# Patient Record
Sex: FEMALE | Race: BLACK OR AFRICAN AMERICAN | NOT HISPANIC OR LATINO
[De-identification: names, ages, dates, MRNs, and addresses within clinical notes are randomized per-mention and may not be internally consistent; named-entity substitution may affect disease eponyms.]

---

## 2017-06-05 ENCOUNTER — TRANSCRIPTION ENCOUNTER (OUTPATIENT)
Age: 47
End: 2017-06-05

## 2022-11-03 ENCOUNTER — APPOINTMENT (OUTPATIENT)
Dept: INFECTIOUS DISEASE | Facility: CLINIC | Age: 52
End: 2022-11-03

## 2022-11-03 PROBLEM — Z00.00 ENCOUNTER FOR PREVENTIVE HEALTH EXAMINATION: Status: ACTIVE | Noted: 2022-11-03

## 2022-11-15 ENCOUNTER — NON-APPOINTMENT (OUTPATIENT)
Age: 52
End: 2022-11-15

## 2022-11-17 ENCOUNTER — LABORATORY RESULT (OUTPATIENT)
Age: 52
End: 2022-11-17

## 2022-11-17 ENCOUNTER — APPOINTMENT (OUTPATIENT)
Dept: INFECTIOUS DISEASE | Facility: CLINIC | Age: 52
End: 2022-11-17

## 2022-11-17 ENCOUNTER — NON-APPOINTMENT (OUTPATIENT)
Age: 52
End: 2022-11-17

## 2022-11-17 VITALS
SYSTOLIC BLOOD PRESSURE: 153 MMHG | HEART RATE: 72 BPM | HEIGHT: 62 IN | WEIGHT: 198 LBS | BODY MASS INDEX: 36.44 KG/M2 | TEMPERATURE: 97.7 F | DIASTOLIC BLOOD PRESSURE: 89 MMHG | OXYGEN SATURATION: 98 %

## 2022-11-17 DIAGNOSIS — E11.9 TYPE 2 DIABETES MELLITUS W/OUT COMPLICATIONS: ICD-10-CM

## 2022-11-17 DIAGNOSIS — I10 ESSENTIAL (PRIMARY) HYPERTENSION: ICD-10-CM

## 2022-11-17 DIAGNOSIS — J30.2 OTHER SEASONAL ALLERGIC RHINITIS: ICD-10-CM

## 2022-11-17 DIAGNOSIS — E78.5 HYPERLIPIDEMIA, UNSPECIFIED: ICD-10-CM

## 2022-11-17 PROCEDURE — 99203 OFFICE O/P NEW LOW 30 MIN: CPT

## 2022-11-17 RX ORDER — CHOLECALCIFEROL (VITAMIN D3) 10(400)/ML
DROPS ORAL
Qty: 100 | Refills: 0 | Status: ACTIVE | COMMUNITY
Start: 2022-07-06

## 2022-11-17 RX ORDER — SEMAGLUTIDE 1.34 MG/ML
2 INJECTION, SOLUTION SUBCUTANEOUS
Qty: 2 | Refills: 0 | Status: ACTIVE | COMMUNITY
Start: 2022-10-21

## 2022-11-17 RX ORDER — GLIPIZIDE 5 MG/1
5 TABLET ORAL
Qty: 90 | Refills: 0 | Status: ACTIVE | COMMUNITY
Start: 2022-10-21

## 2022-11-17 RX ORDER — BLOOD-GLUCOSE METER
W/DEVICE EACH MISCELLANEOUS
Qty: 1 | Refills: 0 | Status: ACTIVE | COMMUNITY
Start: 2022-07-06

## 2022-11-17 RX ORDER — ISOPROPYL ALCOHOL 70 ML/100ML
70 SWAB TOPICAL
Qty: 100 | Refills: 0 | Status: ACTIVE | COMMUNITY
Start: 2022-07-06

## 2022-11-17 RX ORDER — HYDROCHLOROTHIAZIDE 12.5 MG/1
12.5 CAPSULE ORAL
Qty: 30 | Refills: 0 | Status: ACTIVE | COMMUNITY
Start: 2022-11-08

## 2022-11-17 RX ORDER — AZELASTINE HYDROCHLORIDE 137 UG/1
0.1 SPRAY, METERED NASAL
Refills: 0 | Status: ACTIVE | COMMUNITY
Start: 2022-09-15

## 2022-11-17 RX ORDER — ATORVASTATIN CALCIUM 20 MG/1
20 TABLET, FILM COATED ORAL
Refills: 0 | Status: ACTIVE | COMMUNITY
Start: 2022-10-30

## 2022-11-17 RX ORDER — QUINAPRIL HYDROCHLORIDE 40 MG/1
40 TABLET, FILM COATED ORAL
Qty: 30 | Refills: 0 | Status: ACTIVE | COMMUNITY
Start: 2022-11-08

## 2022-11-17 RX ORDER — AMLODIPINE BESYLATE 10 MG/1
10 TABLET ORAL
Qty: 30 | Refills: 0 | Status: ACTIVE | COMMUNITY
Start: 2022-10-22

## 2022-11-17 RX ORDER — BLOOD SUGAR DIAGNOSTIC
STRIP MISCELLANEOUS
Qty: 100 | Refills: 0 | Status: ACTIVE | COMMUNITY
Start: 2022-08-22

## 2022-11-22 ENCOUNTER — APPOINTMENT (OUTPATIENT)
Dept: INFECTIOUS DISEASE | Facility: CLINIC | Age: 52
End: 2022-11-22

## 2022-11-22 PROCEDURE — 99213 OFFICE O/P EST LOW 20 MIN: CPT | Mod: 95

## 2022-11-22 NOTE — HISTORY OF PRESENT ILLNESS
[FreeTextEntry1] : Reason For telehealth  Visit 22\par \par ANANYA VILLEGAS is a pleasant 51 year old female being seen for a second evaluation of an existing diagnosis.\par All labs were resulted and HIV-1 was confirmed positive with low level viremia of 39. \par KARYN was also noted elevated at 1:320. and will need a visit with rheumatology. \par I discussed risks and benefits of medication and will start on Biktarvy once daily . I sent to [facility]. I will see her again in one month. \par She states she is ok with the diagnosis. And will reach out for groups if needed. \par Med Hx : She was  kidney specialist due to spilling protein and took and HIV test and was positive, did not come in with labs in our ID  office . Jose lives in [place]. one 24 year old child who is negative . Upcoming Marriage to long term male partner of 7 yars. She last tested for HIV about 10 years ago. Some Tattoos and no transfusions. \par meds: HCTZ, lipitor, glyerzide. Ozempic, axelistein nasal spray., amlodipine, quinapril \par PMH : gallstones, HTN, diabetic, hyperlipidemia,\par Surgury: one \par social drinker, non smoker.\par both parents --mother passed from Leukemia age 65 and father esophgeeal cancer( drinker and smoker) in 50's. \par No allergies to meds and seasonal allergies. \par plan 22\par 1) start Biktarvy, sent to [facility]\par 2) see in person in one month. \par 3) needs rheumatology referral and social work. \par \par  \par Physical Exam\par Constitutional: alert and in no acute distress. \par Eyes: the sclera and conjunctiva were normal, pupils were equal in size, round, reactive to light and extraocular movements were intact. \par ENT: the ears and nose were normal in appearance . the oropharynx was normal with no thrush. \par Neck: the appearance of the neck was normal, no neck mass was observed and the thyroid was not enlarged . there was no jugular-venous distention. \par Pulmonary: no respiratory distress and lungs were clear to auscultation bilaterally. \par Heart: heart rate was normal and rhythm regular, normal S1 and S2, no gallops, no murmurs and no pericardial rub. \par Vascular: the pedal pulses are present . there was no peripheral edema. \par Abdomen: normal bowel sounds, soft, non-tender, no hepato-splenomegaly, no abdominal mass palpated and no CVA tenderness. \par Lymphatics: no palpable adenopathy. \par Musculoskeletal: no joint swelling, no clubbing or cyanosis of the fingernails, muscle strength and tone were normal. \par Skin: normal skin color and pigmentation and no rash. \par Neurological: deep tendon reflexes were 2+ and symmetric, the sensory exam was normal to light touch and pinprick and no focal deficits. \par Psychiatric: oriented to person, place, and time and the affect was normal. \par  \par     \par \par Electronically signed by : SUNDAY GHOTRA N.P.; 2022 10:19AM EST (Author)\par

## 2022-11-30 ENCOUNTER — NON-APPOINTMENT (OUTPATIENT)
Age: 52
End: 2022-11-30

## 2022-12-29 ENCOUNTER — APPOINTMENT (OUTPATIENT)
Dept: INFECTIOUS DISEASE | Facility: CLINIC | Age: 52
End: 2022-12-29
Payer: COMMERCIAL

## 2022-12-29 ENCOUNTER — OUTPATIENT (OUTPATIENT)
Dept: OUTPATIENT SERVICES | Facility: HOSPITAL | Age: 52
LOS: 1 days | End: 2022-12-29
Payer: COMMERCIAL

## 2022-12-29 VITALS
WEIGHT: 198 LBS | DIASTOLIC BLOOD PRESSURE: 86 MMHG | TEMPERATURE: 98.2 F | OXYGEN SATURATION: 99 % | HEIGHT: 62 IN | HEART RATE: 104 BPM | BODY MASS INDEX: 36.44 KG/M2 | SYSTOLIC BLOOD PRESSURE: 148 MMHG

## 2022-12-29 DIAGNOSIS — Z00.00 ENCOUNTER FOR GENERAL ADULT MEDICAL EXAMINATION WITHOUT ABNORMAL FINDINGS: ICD-10-CM

## 2022-12-29 DIAGNOSIS — Z23 ENCOUNTER FOR IMMUNIZATION: ICD-10-CM

## 2022-12-29 PROCEDURE — 86360 T CELL ABSOLUTE COUNT/RATIO: CPT

## 2022-12-29 PROCEDURE — 99214 OFFICE O/P EST MOD 30 MIN: CPT | Mod: 25

## 2022-12-29 PROCEDURE — 90739 HEPB VACC 2/4 DOSE ADULT IM: CPT

## 2022-12-29 PROCEDURE — 85025 COMPLETE CBC W/AUTO DIFF WBC: CPT

## 2022-12-29 PROCEDURE — 90472 IMMUNIZATION ADMIN EACH ADD: CPT

## 2022-12-29 PROCEDURE — 90686 IIV4 VACC NO PRSV 0.5 ML IM: CPT

## 2022-12-29 PROCEDURE — 87536 HIV-1 QUANT&REVRSE TRNSCRPJ: CPT

## 2022-12-29 PROCEDURE — G0008: CPT

## 2022-12-29 PROCEDURE — 86359 T CELLS TOTAL COUNT: CPT

## 2022-12-29 PROCEDURE — 36415 COLL VENOUS BLD VENIPUNCTURE: CPT

## 2022-12-29 PROCEDURE — G0010: CPT

## 2022-12-29 PROCEDURE — 80053 COMPREHEN METABOLIC PANEL: CPT

## 2022-12-29 NOTE — HISTORY OF PRESENT ILLNESS
[FreeTextEntry1] : 22\par ANANYA VILLEGAS is a pleasant 51 year old female being seen for a second evaluation of an existing diagnosis.\par All labs were resulted and HIV-1 was confirmed positive with low level viremia of 39. \par KARYN was also noted elevated at 1:320. and will need a visit with rheumatology. \par I discussed risks and benefits of medication and will start on Biktarvy once daily . I sent to [facility]. I will see her again in one month. \par She states she is ok with the diagnosis. And will reach out for groups if needed. \par Med Hx : She was kidney specialist due to spilling protein and took and HIV test and was positive, did not come in with labs in our ID office . Jose lives in [place]. one 24 year old child who is negative . Upcoming Marriage to long term male partner of 7 yars. She last tested for HIV about 10 years ago. Some Tattoos and no transfusions. \par meds: HCTZ, lipitor, glyerzide. Ozempic, axelistein nasal spray., amlodipine, quinapril \par PMH : gallstones, HTN, diabetic, hyperlipidemia,\par Surgury: one \par social drinker, non smoker.\par both parents --mother passed from Leukemia age 65 and father esophgeeal cancer( drinker and smoker) in 50's. \par No allergies to meds and seasonal allergies. \par plan 22\par 1) Patient wants to hold off  Biktarvy--still processing the diagnosis, sent to [facility]\par 2) see in person in 2 month. \par 3) interested in Hep B vaccine and shingles vaccine \par \par  \par Physical Exam\par Constitutional: alert and in no acute distress. \par Eyes: the sclera and conjunctiva were normal, pupils were equal in size, round, reactive to light and extraocular movements were intact. \par ENT: the ears and nose were normal in appearance . the oropharynx was normal with no thrush. \par Neck: the appearance of the neck was normal, no neck mass was observed and the thyroid was not enlarged . there was no jugular-venous distention. \par Pulmonary: no respiratory distress and lungs were clear to auscultation bilaterally. \par Heart: heart rate was normal and rhythm regular, normal S1 and S2, no gallops, no murmurs and no pericardial rub. \par Vascular: the pedal pulses are present . there was no peripheral edema. \par Abdomen: normal bowel sounds, soft, non-tender, no hepato-splenomegaly, no abdominal mass palpated and no CVA tenderness. \par Lymphatics: no palpable adenopathy. \par Musculoskeletal: no joint swelling, no clubbing or cyanosis of the fingernails, muscle strength and tone were normal. \par Skin: normal skin color and pigmentation and no rash. \par Neurological: deep tendon reflexes were 2+ and symmetric, the sensory exam was normal to light touch and pinprick and no focal deficits. \par Psychiatric: oriented to person, place, and time and the affect was normal. \par  \par  \par \par Electronically signed by : SUNDAY GHOTRA N.P.; 2022 10:19AM EST (Author)\par \par  \par Active Problems\par Asymptomatic HIV infection (V08) (Z21)\par Dyslipidemia (272.4) (E78.5)\par Hypertension (401.9) (I10)\par Seasonal allergies (477.9) (J30.2)\par Type 2 diabetes mellitus (250.00) (E11.9)\par \par Current Meds\par amLODIPine Besylate 10 MG Oral Tablet\par Atorvastatin Calcium 20 MG Oral Tablet\par Azelastine HCl - 0.1 % Nasal Solution; INSTILL TWO SPRAYS IN EACH NOSTRIL TWO\par TIMES A DAY\par Easy Touch Alcohol Prep Medium 70 % Pad; TEST DAILY BEFORE ALL MEALSSNACKS\par AND ONCE BEFORE BEDTIME\par glipiZIDE 5 MG Oral Tablet; take 1 tablet by mouth three times a day before meals\par hydroCHLOROthiazide 12.5 MG Oral Capsule\par OneTouch Verio Flex System w/Device Kit\par OneTouch Verio In Vitro Strip\par Ozempic (0.25 or 0.5 MG/DOSE) 2 MG/1.5ML Subcutaneous Solution Pen-injector; inject\par 0.25 milligrams subcutaneously every week\par Pharmacist Choice Lancets; TEST DAILY BEFORE ALL MEALSSNACKS AND ONCE\par BEFORE BEDTIME\par Quinapril HCl - 40 MG Oral Tablet\par \par Allergies\par No Known Allergies\par

## 2023-01-03 LAB
ALBUMIN SERPL ELPH-MCNC: 4.3 G/DL
ALP BLD-CCNC: 98 U/L
ALT SERPL-CCNC: 14 U/L
ANION GAP SERPL CALC-SCNC: 22 MMOL/L
AST SERPL-CCNC: 14 U/L
BASOPHILS # BLD AUTO: 0.02 K/UL
BASOPHILS NFR BLD AUTO: 0.4 %
BILIRUB SERPL-MCNC: 0.2 MG/DL
BUN SERPL-MCNC: 18 MG/DL
CALCIUM SERPL-MCNC: 9.9 MG/DL
CD3 CELLS # BLD: 1175 CELLS/UL
CD3 CELLS NFR BLD: 86 %
CD3+CD4+ CELLS # BLD: 423 CELLS/UL
CD3+CD4+ CELLS NFR BLD: 31 %
CD3+CD4+ CELLS/CD3+CD8+ CLL SPEC: 0.6 RATIO
CD3+CD8+ CELLS # SPEC: 702 CELLS/UL
CD3+CD8+ CELLS NFR BLD: 51 %
CHLORIDE SERPL-SCNC: 95 MMOL/L
CO2 SERPL-SCNC: 20 MMOL/L
CREAT SERPL-MCNC: 0.73 MG/DL
EGFR: 99 ML/MIN/1.73M2
EOSINOPHIL # BLD AUTO: 0.14 K/UL
EOSINOPHIL NFR BLD AUTO: 2.8 %
GLUCOSE SERPL-MCNC: 317 MG/DL
HCT VFR BLD CALC: 39.9 %
HGB BLD-MCNC: 13.1 G/DL
HIV1 RNA # SERPL NAA+PROBE: 38
HIV1 RNA # SERPL NAA+PROBE: ABNORMAL
IMM GRANULOCYTES NFR BLD AUTO: 0.2 %
LYMPHOCYTES # BLD AUTO: 1.57 K/UL
LYMPHOCYTES NFR BLD AUTO: 31.7 %
MAN DIFF?: NORMAL
MCHC RBC-ENTMCNC: 29.3 PG
MCHC RBC-ENTMCNC: 32.8 GM/DL
MCV RBC AUTO: 89.3 FL
MONOCYTES # BLD AUTO: 0.44 K/UL
MONOCYTES NFR BLD AUTO: 8.9 %
NEUTROPHILS # BLD AUTO: 2.78 K/UL
NEUTROPHILS NFR BLD AUTO: 56 %
PLATELET # BLD AUTO: 375 K/UL
POTASSIUM SERPL-SCNC: 4.4 MMOL/L
PROT SERPL-MCNC: 7.9 G/DL
RBC # BLD: 4.47 M/UL
RBC # FLD: 13.5 %
SODIUM SERPL-SCNC: 137 MMOL/L
VIRAL LOAD INTERP: NORMAL
VIRAL LOAD LOG: 1.58
WBC # FLD AUTO: 4.96 K/UL

## 2023-01-19 ENCOUNTER — APPOINTMENT (OUTPATIENT)
Dept: RHEUMATOLOGY | Facility: CLINIC | Age: 53
End: 2023-01-19
Payer: COMMERCIAL

## 2023-01-19 VITALS
HEART RATE: 100 BPM | OXYGEN SATURATION: 98 % | SYSTOLIC BLOOD PRESSURE: 150 MMHG | RESPIRATION RATE: 16 BRPM | DIASTOLIC BLOOD PRESSURE: 80 MMHG | TEMPERATURE: 97.8 F | BODY MASS INDEX: 37.36 KG/M2 | HEIGHT: 62 IN | WEIGHT: 203 LBS

## 2023-01-19 DIAGNOSIS — R76.8 OTHER SPECIFIED ABNORMAL IMMUNOLOGICAL FINDINGS IN SERUM: ICD-10-CM

## 2023-01-19 DIAGNOSIS — G89.29 PAIN IN UNSPECIFIED JOINT: ICD-10-CM

## 2023-01-19 DIAGNOSIS — M25.50 PAIN IN UNSPECIFIED JOINT: ICD-10-CM

## 2023-01-19 DIAGNOSIS — M35.00 SICCA SYNDROME, UNSPECIFIED: ICD-10-CM

## 2023-01-19 PROCEDURE — 99204 OFFICE O/P NEW MOD 45 MIN: CPT

## 2023-01-19 NOTE — PHYSICAL EXAM
[General Appearance - Alert] : alert [General Appearance - In No Acute Distress] : in no acute distress [Full Pulse] : the pedal pulses are present [Edema] : there was no peripheral edema [Abnormal Walk] : normal gait [Nail Clubbing] : no clubbing  or cyanosis of the fingernails [Musculoskeletal - Swelling] : no joint swelling seen [Oriented To Time, Place, And Person] : oriented to person, place, and time [Impaired Insight] : insight and judgment were intact [Affect] : the affect was normal

## 2023-01-27 ENCOUNTER — NON-APPOINTMENT (OUTPATIENT)
Age: 53
End: 2023-01-27

## 2023-02-17 ENCOUNTER — APPOINTMENT (OUTPATIENT)
Dept: RHEUMATOLOGY | Facility: CLINIC | Age: 53
End: 2023-02-17

## 2023-02-17 NOTE — ASSESSMENT
[FreeTextEntry1] : 1. Positive KARYN\par The significance of positive KARYN was discussed with the patient in great detail. I will check second tier serology as outlined below. Positive KARYN may be seen in the setting of various  autoimmune diseases including but not limited to SLE, rheumatoid arthritis, Sjogren's syndrome,  autoimmune thyroid disease, scleroderma and others. KARYN may also be present in the setting of viral illness as well as in healthy individuals.\par Given sicca symptoms, Sjogren's is on top of the differential \par 2. Joint pain \par no evidence of inflammatory arthritis on exam at present \par check serology as outlined above\par \par patient to call for test results\par \par

## 2023-02-17 NOTE — HISTORY OF PRESENT ILLNESS
[FreeTextEntry1] : knee pain started in teenage years and hip pain started when she was 26 yo\par She still has joint pain, comes and goes\par History of scalp psoriasis\par occasional hand pain in the right wrist (has a dorsal cyst)\par ROS: scalp psoriasis, post-nasal drip related cough, hair thinning\par no fevers or chills \par no shortness of breath \par no joint swelling\par \par Pt has history of proteinuria and recently seen by nephrologist and was found to have positive KARYN and HIV test. \par \par Labs reviewed: HIV viral load: detectable, hepatitis test negative \par KARYN positive 1:320\par +Bence Bergeron proteinuria (seen by [facility] and was told to f/u in 6 months)\par +leukopenia \par \par FHx: father--esophageal cancer\par mother--leukemia\par \par OB: one child, DM during pregnancy,  due to failure to progress\par no miscarriages\par \par No history of thrombosis\par

## 2023-02-19 LAB
ANA SER IF-ACNC: NEGATIVE
APPEARANCE: ABNORMAL
APTT BLD: 29.7 SEC
B2 GLYCOPROT1 AB SER QL: NEGATIVE
BACTERIA: ABNORMAL
BILIRUBIN URINE: NEGATIVE
BLOOD URINE: NEGATIVE
CARDIOLIPIN AB SER IA-ACNC: NEGATIVE
CCP AB SER IA-ACNC: <8 UNITS
CENTROMERE IGG SER-ACNC: <0.2 CD:130001892
COLOR: YELLOW
CREAT SPEC-SCNC: 111 MG/DL
CREAT/PROT UR: 0.9 RATIO
CRP SERPL-MCNC: 4 MG/L
DSDNA AB SER-ACNC: <12 IU/ML
ENA RNP AB SER IA-ACNC: <0.2 AL
ENA SCL70 IGG SER IA-ACNC: <0.2 AL
ENA SM AB SER IA-ACNC: <0.2 AL
ENA SS-A AB SER IA-ACNC: <0.2 AL
ENA SS-B AB SER IA-ACNC: <0.2 AL
ERYTHROCYTE [SEDIMENTATION RATE] IN BLOOD BY WESTERGREN METHOD: 78 MM/HR
GLUCOSE QUALITATIVE U: ABNORMAL
HYALINE CASTS: 0 /LPF
KETONES URINE: NEGATIVE
LEUKOCYTE ESTERASE URINE: ABNORMAL
MICROSCOPIC-UA: NORMAL
NITRITE URINE: NEGATIVE
PH URINE: 6.5
PROT UR-MCNC: 97 MG/DL
PROTEIN URINE: ABNORMAL
RED BLOOD CELLS URINE: 3 /HPF
RF+CCP IGG SER-IMP: NEGATIVE
RNA POLYMERASE III IGG: 5 UNITS
SPECIFIC GRAVITY URINE: 1.02
SQUAMOUS EPITHELIAL CELLS: 5 /HPF
THYROGLOB AB SERPL-ACNC: <20 IU/ML
THYROPEROXIDASE AB SERPL IA-ACNC: <10 IU/ML
UROBILINOGEN URINE: NORMAL
WHITE BLOOD CELLS URINE: 17 /HPF

## 2023-02-22 ENCOUNTER — NON-APPOINTMENT (OUTPATIENT)
Age: 53
End: 2023-02-22

## 2023-02-23 ENCOUNTER — APPOINTMENT (OUTPATIENT)
Dept: INFECTIOUS DISEASE | Facility: CLINIC | Age: 53
End: 2023-02-23

## 2023-03-01 ENCOUNTER — FORM ENCOUNTER (OUTPATIENT)
Age: 53
End: 2023-03-01

## 2023-03-01 ENCOUNTER — NON-APPOINTMENT (OUTPATIENT)
Age: 53
End: 2023-03-01

## 2023-03-02 ENCOUNTER — APPOINTMENT (OUTPATIENT)
Dept: INFECTIOUS DISEASE | Facility: CLINIC | Age: 53
End: 2023-03-02

## 2023-03-02 ENCOUNTER — OUTPATIENT (OUTPATIENT)
Dept: OUTPATIENT SERVICES | Facility: HOSPITAL | Age: 53
LOS: 1 days | End: 2023-03-02
Payer: COMMERCIAL

## 2023-03-02 ENCOUNTER — LABORATORY RESULT (OUTPATIENT)
Age: 53
End: 2023-03-02

## 2023-03-02 ENCOUNTER — APPOINTMENT (OUTPATIENT)
Dept: INFECTIOUS DISEASE | Facility: CLINIC | Age: 53
End: 2023-03-02
Payer: COMMERCIAL

## 2023-03-02 VITALS
HEIGHT: 62 IN | BODY MASS INDEX: 36.8 KG/M2 | DIASTOLIC BLOOD PRESSURE: 86 MMHG | SYSTOLIC BLOOD PRESSURE: 146 MMHG | TEMPERATURE: 98.5 F | HEART RATE: 105 BPM | WEIGHT: 200 LBS

## 2023-03-02 DIAGNOSIS — Z92.89 PERSONAL HISTORY OF OTHER MEDICAL TREATMENT: ICD-10-CM

## 2023-03-02 DIAGNOSIS — Z01.00 ENCOUNTER FOR EXAMINATION OF EYES AND VISION W/OUT ABNORMAL FINDINGS: ICD-10-CM

## 2023-03-02 DIAGNOSIS — Z01.419 ENCOUNTER FOR GYNECOLOGICAL EXAMINATION (GENERAL) (ROUTINE) W/OUT ABNORMAL FINDINGS: ICD-10-CM

## 2023-03-02 DIAGNOSIS — Z00.00 ENCOUNTER FOR GENERAL ADULT MEDICAL EXAMINATION WITHOUT ABNORMAL FINDINGS: ICD-10-CM

## 2023-03-02 PROCEDURE — 82306 VITAMIN D 25 HYDROXY: CPT

## 2023-03-02 PROCEDURE — 87536 HIV-1 QUANT&REVRSE TRNSCRPJ: CPT

## 2023-03-02 PROCEDURE — 85025 COMPLETE CBC W/AUTO DIFF WBC: CPT

## 2023-03-02 PROCEDURE — 90715 TDAP VACCINE 7 YRS/> IM: CPT | Mod: 1L

## 2023-03-02 PROCEDURE — G0010: CPT | Mod: 1L

## 2023-03-02 PROCEDURE — 99213 OFFICE O/P EST LOW 20 MIN: CPT | Mod: 1L,25

## 2023-03-02 PROCEDURE — 90472 IMMUNIZATION ADMIN EACH ADD: CPT | Mod: 1L

## 2023-03-02 PROCEDURE — 36415 COLL VENOUS BLD VENIPUNCTURE: CPT

## 2023-03-02 PROCEDURE — 83036 HEMOGLOBIN GLYCOSYLATED A1C: CPT

## 2023-03-02 PROCEDURE — 87340 HEPATITIS B SURFACE AG IA: CPT

## 2023-03-02 PROCEDURE — 90739 HEPB VACC 2/4 DOSE ADULT IM: CPT | Mod: 1L

## 2023-03-02 PROCEDURE — 86360 T CELL ABSOLUTE COUNT/RATIO: CPT

## 2023-03-02 PROCEDURE — 86359 T CELLS TOTAL COUNT: CPT

## 2023-03-02 PROCEDURE — 80053 COMPREHEN METABOLIC PANEL: CPT

## 2023-03-02 PROCEDURE — 80061 LIPID PANEL: CPT

## 2023-03-02 NOTE — HISTORY OF PRESENT ILLNESS
[FreeTextEntry1] : Reason For Visit\par 3/2/23 \par ANANYA VILLEGAS is a pleasant 52 year old female being seen for a second evaluation of an existing diagnosis.\par All labs were resulted and HIV-1 was confirmed positive with low level viremia of 39. \par KARYN was also noted elevated at 1:320. She recently was seen by rheumatology and all was fine.. \par I discussed risks and benefits of medication and was started  on Biktarvy once daily .\par She states she is ok with the diagnosis. And will reach out for groups if needed. \par Med Hx : She was kidney specialist due to spilling protein and took and HIV test and was positive, did not come in with labs in our ID office . Jose lives in [place]. one 24 year old child who is negative . Upcoming Marriage to long term male partner of 7 years. She last tested for HIV about 10 years ago. States has Tattoos and no transfusions. \par meds: HCTZ, lipitor, glyerzide. Ozempic, axelistein nasal spray., amlodipine, quinapril \par PMH : gallstones, HTN, diabetic, hyperlipidemia,\par Surgury: one \par social drinker, non smoker.\par both parents --mother passed from Leukemia age 65 and father esophgeeal cancer( drinker and smoker) in 50's. \par No allergies to meds and seasonal allergies.\par  \par plan 3/2/23\par 1) Taking Biktarvy\par 2) see in person in 6 month or telehealth. \par 3)  Hep B vaccine and TDAP today \par \par  \par Physical Exam\par Constitutional: alert and in no acute distress. \par Eyes: the sclera and conjunctiva were normal, pupils were equal in size, round, reactive to light and extraocular movements were intact. \par ENT: the ears and nose were normal in appearance . the oropharynx was normal with no thrush. \par Neck: the appearance of the neck was normal, no neck mass was observed and the thyroid was not enlarged . there was no jugular-venous distention. \par Pulmonary: no respiratory distress and lungs were clear to auscultation bilaterally. \par Heart: heart rate was normal and rhythm regular, normal S1 and S2, no gallops, no murmurs and no pericardial rub. \par Vascular: the pedal pulses are present . there was no peripheral edema. \par Abdomen: normal bowel sounds, soft, non-tender, no hepato-splenomegaly, no abdominal mass palpated and no CVA tenderness. \par Lymphatics: no palpable adenopathy. \par Musculoskeletal: no joint swelling, no clubbing or cyanosis of the fingernails, muscle strength and tone were normal. \par Skin: normal skin color and pigmentation and no rash. \par Neurological: deep tendon reflexes were 2+ and symmetric, the sensory exam was normal to light touch and pinprick and no focal deficits. \par Psychiatric: oriented to person, place, and time and the affect was normal. \par  \par  \par \par Electronically signed by : SUNDAY GHOTRA N.P.; 2022 10:19AM EST (Author)\par \par  \par Active Problems\par Asymptomatic HIV infection (V08) (Z21)\par Dyslipidemia (272.4) (E78.5)\par Hypertension (401.9) (I10)\par Seasonal allergies (477.9) (J30.2)\par Type 2 diabetes mellitus (250.00) (E11.9)\par \par Current Meds\par amLODIPine Besylate 10 MG Oral Tablet\par Atorvastatin Calcium 20 MG Oral Tablet\par Azelastine HCl - 0.1 % Nasal Solution; INSTILL TWO SPRAYS IN EACH NOSTRIL TWO\par TIMES A DAY\par Easy Touch Alcohol Prep Medium 70 % Pad; TEST DAILY BEFORE ALL MEALSSNACKS\par AND ONCE BEFORE BEDTIME\par glipiZIDE 5 MG Oral Tablet; take 1 tablet by mouth three times a day before meals\par hydroCHLOROthiazide 12.5 MG Oral Capsule\par OneTouch Verio Flex System w/Device Kit\par OneTouch Verio In Vitro Strip\par Ozempic (0.25 or 0.5 MG/DOSE) 2 MG/1.5ML Subcutaneous Solution Pen-injector; inject\par 0.25 milligrams subcutaneously every week\par Pharmacist Choice Lancets; TEST DAILY BEFORE ALL MEALSSNACKS AND ONCE\par BEFORE BEDTIME\par Quinapril HCl - 40 MG Oral Tablet\par \par Allergies\par No Known Allergies\par

## 2023-03-09 ENCOUNTER — NON-APPOINTMENT (OUTPATIENT)
Age: 53
End: 2023-03-09

## 2023-04-07 ENCOUNTER — NON-APPOINTMENT (OUTPATIENT)
Age: 53
End: 2023-04-07

## 2023-05-16 ENCOUNTER — RX RENEWAL (OUTPATIENT)
Age: 53
End: 2023-05-16

## 2023-10-06 ENCOUNTER — APPOINTMENT (OUTPATIENT)
Dept: INFECTIOUS DISEASE | Facility: CLINIC | Age: 53
End: 2023-10-06

## 2023-10-10 ENCOUNTER — APPOINTMENT (OUTPATIENT)
Dept: INFECTIOUS DISEASE | Facility: CLINIC | Age: 53
End: 2023-10-10
Payer: COMMERCIAL

## 2023-10-10 PROCEDURE — 99443: CPT

## 2023-11-08 LAB
25(OH)D3 SERPL-MCNC: 42.3 NG/ML
ALBUMIN SERPL ELPH-MCNC: 4.6 G/DL
ALP BLD-CCNC: 114 U/L
ALT SERPL-CCNC: 15 U/L
ANION GAP SERPL CALC-SCNC: 17 MMOL/L
APPEARANCE: CLEAR
AST SERPL-CCNC: 12 U/L
BACTERIA: ABNORMAL /HPF
BILIRUB SERPL-MCNC: 0.5 MG/DL
BILIRUBIN URINE: NEGATIVE
BLOOD URINE: NEGATIVE
BUN SERPL-MCNC: 27 MG/DL
C TRACH RRNA SPEC QL NAA+PROBE: NOT DETECTED
CALCIUM SERPL-MCNC: 9.9 MG/DL
CAST: 2 /LPF
CD3 CELLS # BLD: 1558 CELLS/UL
CD3 CELLS NFR BLD: 81 %
CD3+CD4+ CELLS # BLD: 459 CELLS/UL
CD3+CD4+ CELLS NFR BLD: 24 %
CD3+CD4+ CELLS/CD3+CD8+ CLL SPEC: 0.45 RATIO
CD3+CD8+ CELLS # SPEC: 1013 CELLS/UL
CD3+CD8+ CELLS NFR BLD: 53 %
CHLORIDE SERPL-SCNC: 94 MMOL/L
CHOLEST SERPL-MCNC: 235 MG/DL
CO2 SERPL-SCNC: 24 MMOL/L
COLOR: YELLOW
CREAT SERPL-MCNC: 1.13 MG/DL
EGFR: 59 ML/MIN/1.73M2
EPITHELIAL CELLS: 3 /HPF
ESTIMATED AVERAGE GLUCOSE: 223 MG/DL
GLUCOSE QUALITATIVE U: >=1000 MG/DL
GLUCOSE SERPL-MCNC: 342 MG/DL
HBA1C MFR BLD HPLC: 9.4 %
HCT VFR BLD CALC: 41.2 %
HDLC SERPL-MCNC: 85 MG/DL
HGB BLD-MCNC: 13.3 G/DL
HIV1 RNA # SERPL NAA+PROBE: NORMAL
HIV1 RNA # SERPL NAA+PROBE: NORMAL COPIES/ML
KETONES URINE: NEGATIVE MG/DL
LDLC SERPL CALC-MCNC: 124 MG/DL
LEUKOCYTE ESTERASE URINE: NEGATIVE
MCHC RBC-ENTMCNC: 30.1 PG
MCHC RBC-ENTMCNC: 32.3 GM/DL
MCV RBC AUTO: 93.2 FL
MICROSCOPIC-UA: NORMAL
N GONORRHOEA RRNA SPEC QL NAA+PROBE: NOT DETECTED
NITRITE URINE: NEGATIVE
NONHDLC SERPL-MCNC: 150 MG/DL
PH URINE: 5.5
PLATELET # BLD AUTO: 374 K/UL
POTASSIUM SERPL-SCNC: 4.5 MMOL/L
PROT SERPL-MCNC: 8.4 G/DL
PROTEIN URINE: 100 MG/DL
RBC # BLD: 4.42 M/UL
RBC # FLD: 14.5 %
RED BLOOD CELLS URINE: 0 /HPF
REVIEW: NORMAL
SODIUM SERPL-SCNC: 134 MMOL/L
SOURCE AMPLIFICATION: NORMAL
SPECIFIC GRAVITY URINE: 1.03
T PALLIDUM AB SER QL IA: NEGATIVE
TRIGL SERPL-MCNC: 155 MG/DL
TSH SERPL-ACNC: 1.51 UIU/ML
UROBILINOGEN URINE: 0.2 MG/DL
VIRAL LOAD INTERP: NORMAL
VIRAL LOAD LOG: NORMAL LG COP/ML
WBC # FLD AUTO: 4.99 K/UL
WHITE BLOOD CELLS URINE: 12 /HPF

## 2024-04-09 ENCOUNTER — APPOINTMENT (OUTPATIENT)
Dept: INFECTIOUS DISEASE | Facility: CLINIC | Age: 54
End: 2024-04-09
Payer: COMMERCIAL

## 2024-04-09 ENCOUNTER — NON-APPOINTMENT (OUTPATIENT)
Age: 54
End: 2024-04-09

## 2024-04-09 DIAGNOSIS — Z21 ASYMPTOMATIC HUMAN IMMUNODEFICIENCY VIRUS [HIV] INFECTION STATUS: ICD-10-CM

## 2024-04-09 LAB
25(OH)D3 SERPL-MCNC: 48.9 NG/ML
APPEARANCE: CLEAR
BACTERIA: ABNORMAL /HPF
BILIRUBIN URINE: NEGATIVE
BLOOD URINE: ABNORMAL
CAST: 6 /LPF
CD3 CELLS # BLD: 1863 CELLS/UL
CD3 CELLS NFR BLD: 84 %
CD3+CD4+ CELLS # BLD: 643 CELLS/UL
CD3+CD4+ CELLS NFR BLD: 29 %
CD3+CD4+ CELLS/CD3+CD8+ CLL SPEC: 0.57 RATIO
CD3+CD8+ CELLS # SPEC: 1135 CELLS/UL
CD3+CD8+ CELLS NFR BLD: 51 %
CHOLEST SERPL-MCNC: 208 MG/DL
COLOR: YELLOW
CYSTATIN C SERPL-MCNC: 0.96 MG/L
EPITHELIAL CELLS: 5 /HPF
GFR/BSA.PRED SERPLBLD CYS-BASED-ARV: 79 ML/MIN/1.73M2
GLUCOSE QUALITATIVE U: >=1000 MG/DL
HDLC SERPL-MCNC: 81 MG/DL
HYALINE CASTS: PRESENT
KETONES URINE: NEGATIVE MG/DL
LDLC SERPL CALC-MCNC: 99 MG/DL
LEUKOCYTE ESTERASE URINE: ABNORMAL
MICROSCOPIC-UA: NORMAL
NITRITE URINE: NEGATIVE
NONHDLC SERPL-MCNC: 127 MG/DL
PH URINE: 6
PROTEIN URINE: 30 MG/DL
RED BLOOD CELLS URINE: 2 /HPF
REVIEW: NORMAL
SPECIFIC GRAVITY URINE: 1.02
TRIGL SERPL-MCNC: 164 MG/DL
TSH SERPL-ACNC: 1.63 UIU/ML
UROBILINOGEN URINE: 0.2 MG/DL
WHITE BLOOD CELLS URINE: 31 /HPF

## 2024-04-09 PROCEDURE — 99443: CPT

## 2024-04-10 ENCOUNTER — NON-APPOINTMENT (OUTPATIENT)
Age: 54
End: 2024-04-10

## 2024-04-10 LAB
ESTIMATED AVERAGE GLUCOSE: 298 MG/DL
HBA1C MFR BLD HPLC: 12 %
HIV1 RNA # SERPL NAA+PROBE: NORMAL
HIV1 RNA # SERPL NAA+PROBE: NORMAL COPIES/ML
VIRAL LOAD INTERP: NORMAL
VIRAL LOAD LOG: NORMAL LG COP/ML

## 2024-04-11 ENCOUNTER — NON-APPOINTMENT (OUTPATIENT)
Age: 54
End: 2024-04-11

## 2024-04-11 LAB
C TRACH RRNA SPEC QL NAA+PROBE: NOT DETECTED
N GONORRHOEA RRNA SPEC QL NAA+PROBE: NOT DETECTED
SOURCE AMPLIFICATION: NORMAL

## 2024-05-02 ENCOUNTER — RX RENEWAL (OUTPATIENT)
Age: 54
End: 2024-05-02

## 2024-05-02 RX ORDER — BICTEGRAVIR SODIUM, EMTRICITABINE, AND TENOFOVIR ALAFENAMIDE FUMARATE 50; 200; 25 MG/1; MG/1; MG/1
50-200-25 TABLET ORAL
Qty: 30 | Refills: 5 | Status: ACTIVE | COMMUNITY
Start: 2022-11-22 | End: 1900-01-01

## 2024-11-05 ENCOUNTER — OUTPATIENT (OUTPATIENT)
Dept: OUTPATIENT SERVICES | Facility: HOSPITAL | Age: 54
LOS: 1 days | End: 2024-11-05

## 2024-11-05 ENCOUNTER — APPOINTMENT (OUTPATIENT)
Dept: INTERNAL MEDICINE | Facility: CLINIC | Age: 54
End: 2024-11-05

## 2025-05-01 ENCOUNTER — RX RENEWAL (OUTPATIENT)
Age: 55
End: 2025-05-01

## 2025-06-30 ENCOUNTER — NON-APPOINTMENT (OUTPATIENT)
Age: 55
End: 2025-06-30

## 2025-07-02 ENCOUNTER — NON-APPOINTMENT (OUTPATIENT)
Age: 55
End: 2025-07-02

## 2025-07-16 ENCOUNTER — NON-APPOINTMENT (OUTPATIENT)
Age: 55
End: 2025-07-16